# Patient Record
Sex: FEMALE | Race: WHITE | NOT HISPANIC OR LATINO | Employment: FULL TIME | URBAN - METROPOLITAN AREA
[De-identification: names, ages, dates, MRNs, and addresses within clinical notes are randomized per-mention and may not be internally consistent; named-entity substitution may affect disease eponyms.]

---

## 2019-06-13 ENCOUNTER — HOSPITAL ENCOUNTER (OUTPATIENT)
Dept: PREADMISSION TESTING | Facility: HOSPITAL | Age: 44
Discharge: HOME | End: 2019-06-13
Attending: ORTHOPAEDIC SURGERY | Admitting: ORTHOPAEDIC SURGERY

## 2019-06-13 LAB
ALBUMIN SERPL-MCNC: 4 G/DL (ref 3.5–4.8)
ALBUMIN/GLOB SERPL: 1.6 {RATIO} (ref 1–1.7)
ALP SERPL-CCNC: 27 IU/L (ref 32–91)
ALT SERPL-CCNC: 13 IU/L (ref 14–54)
ANION GAP SERPL CALC-SCNC: 10.8 MMOL/L (ref 10–20)
AST SERPL-CCNC: 18 IU/L (ref 15–41)
BASOPHILS # BLD AUTO: 0.1 10*3/UL (ref 0–0.2)
BASOPHILS NFR BLD AUTO: 1 % (ref 0–2)
BILIRUB SERPL-MCNC: 0.7 MG/DL (ref 0.3–1.2)
BUN SERPL-MCNC: 10 MG/DL (ref 8–20)
BUN/CREAT SERPL: 16.7 (ref 5.4–26.2)
CALCIUM SERPL-MCNC: 8.9 MG/DL (ref 8.9–10.3)
CHLORIDE SERPL-SCNC: 108 MMOL/L (ref 101–111)
CONV CO2: 24 MMOL/L (ref 22–32)
CONV TOTAL PROTEIN: 6.5 G/DL (ref 6.1–7.9)
CREAT UR-MCNC: 0.6 MG/DL (ref 0.4–1)
DIFFERENTIAL METHOD BLD: (no result)
EOSINOPHIL # BLD AUTO: 0.2 10*3/UL (ref 0–0.3)
EOSINOPHIL # BLD AUTO: 2 % (ref 0–3)
ERYTHROCYTE [DISTWIDTH] IN BLOOD BY AUTOMATED COUNT: 14.8 % (ref 11.5–14.5)
GLOBULIN UR ELPH-MCNC: 2.5 G/DL (ref 2.5–3.8)
GLUCOSE SERPL-MCNC: 82 MG/DL (ref 65–99)
HCT VFR BLD AUTO: 39.4 % (ref 35–49)
HGB BLD-MCNC: 12.8 G/DL (ref 12–15)
LYMPHOCYTES # BLD AUTO: 2.7 10*3/UL (ref 0.8–4.8)
LYMPHOCYTES NFR BLD AUTO: 38 % (ref 18–42)
MCH RBC QN AUTO: 28.5 PG (ref 26–32)
MCHC RBC AUTO-ENTMCNC: 32.5 G/DL (ref 32–36)
MCV RBC AUTO: 87.8 FL (ref 80–94)
MONOCYTES # BLD AUTO: 0.6 10*3/UL (ref 0.1–1.3)
MONOCYTES NFR BLD AUTO: 8 % (ref 2–11)
NEUTROPHILS # BLD AUTO: 3.6 10*3/UL (ref 2.3–8.6)
NEUTROPHILS NFR BLD AUTO: 51 % (ref 50–75)
NRBC BLD AUTO-RTO: 0 /100{WBCS}
NRBC/RBC NFR BLD MANUAL: 0 10*3/UL
PLATELET # BLD AUTO: 228 10*3/UL (ref 150–450)
PMV BLD AUTO: 10.9 FL (ref 7.4–10.4)
POTASSIUM SERPL-SCNC: 3.8 MMOL/L (ref 3.6–5.1)
RBC # BLD AUTO: 4.49 10*6/UL (ref 4–5.4)
SODIUM SERPL-SCNC: 139 MMOL/L (ref 136–144)
WBC # BLD AUTO: 7.1 10*3/UL (ref 4.5–11.5)

## 2019-07-15 ENCOUNTER — OFFICE VISIT (OUTPATIENT)
Dept: PHYSICAL THERAPY | Facility: CLINIC | Age: 44
End: 2019-07-15

## 2019-07-15 ENCOUNTER — TRANSCRIBE ORDERS (OUTPATIENT)
Dept: PHYSICAL THERAPY | Facility: CLINIC | Age: 44
End: 2019-07-15

## 2019-07-15 DIAGNOSIS — M25.562 LEFT KNEE PAIN, UNSPECIFIED CHRONICITY: Primary | ICD-10-CM

## 2019-07-15 DIAGNOSIS — M25.562 ACUTE PAIN OF LEFT KNEE: ICD-10-CM

## 2019-07-15 DIAGNOSIS — S83.242D TEAR OF MEDIAL MENISCUS OF LEFT KNEE, UNSPECIFIED TEAR TYPE, UNSPECIFIED WHETHER OLD OR CURRENT TEAR, SUBSEQUENT ENCOUNTER: ICD-10-CM

## 2019-07-15 DIAGNOSIS — Z98.890 S/P ARTHROSCOPIC PARTIAL MEDIAL MENISCECTOMY: Primary | ICD-10-CM

## 2019-07-15 PROCEDURE — 97161 PT EVAL LOW COMPLEX 20 MIN: CPT | Performed by: PHYSICAL THERAPIST

## 2019-07-15 PROCEDURE — 97014 ELECTRIC STIMULATION THERAPY: CPT | Performed by: PHYSICAL THERAPIST

## 2019-07-15 PROCEDURE — 97110 THERAPEUTIC EXERCISES: CPT | Performed by: PHYSICAL THERAPIST

## 2019-07-15 NOTE — PROGRESS NOTES
Physical Therapy Initial Evaluation and Plan of Care    Patient: Kellen Richard   : 1975  Diagnosis/ICD-10 Code:  S/P arthroscopic partial medial meniscectomy [Z98.890]  Referring practitioner: Rene Bridges MD  Date of Initial Visit: 7/15/2019  Today's Date: 7/15/2019  Patient seen for 1 sessions           Subjective Questionnaire: LEFS: 32/80, indicating 60% impairment      Subjective Evaluation    History of Present Illness  Date of surgery: 2019      Patient Occupation: full time as  at [employer]  Current pain ratin  At best pain ratin  At worst pain ratin  Location: L knee  Relieving factors: ice and medications  Aggravating factors: standing, stairs, ambulation, prolonged positioning, lifting and sleeping  Progression: improved    Patient Goals  Patient goals for therapy: decreased edema, decreased pain, improved balance, increased motion, return to work and increased strength      Pt is s/p partial L medial meniscectomy. Pt reports unsure of what caused injury, no accident or incident. Pt is returning to work tomorrow for a 5 hr shift. Pt reports difficulty with walking, going up/down stairs. Difficulty sleeping.      Objective       Active Range of Motion   Left Knee   Flexion: 100 degrees with pain  Extension: 0 degrees     Right Knee   Flexion: 135 degrees   Extension: 5 degrees     Passive Range of Motion   Left Knee   Flexion: 105 degrees with pain    Strength/Myotome Testing     Left Hip   Planes of Motion   Flexion: 3  Extension: 4-  Abduction: 4-    Right Hip   Planes of Motion   Flexion: 4+  Extension: 4+  Abduction: 4+    Left Knee   Flexion: 3+  Extension: 3    Right Knee   Flexion: 4+  Extension: 4+    Ambulation     Observational Gait   Gait: antalgic   Decreased walking speed, left stance time and left step length.   Left foot contact pattern: foot flat    Quality of Movement During Gait     Knee    Knee (Left): Positive stiff knee and valgus.   Knee  (Right): Positive valgus.     General Comments     Knee Comments  L quad and hip flexor tightness noted.  Mild L hamstring tightness.         Assessment & Plan     Assessment  Impairments: abnormal gait, abnormal muscle firing, abnormal or restricted ROM, activity intolerance, impaired physical strength, lacks appropriate home exercise program and pain with function  Prognosis: good  Functional Limitations: lifting, sleeping, walking, uncomfortable because of pain, sitting and standing  Goals  Plan Goals: STG:  - Pt to increase L knee flexion AROM to 120 deg in 2 weeks.  - Pt to report max pain at 6/10 while working in 2 weeks.    LTG:  - Improve LEFS to 20% or less impairment by discharge.  - Increase L knee flex and ext strength to 4/5 or greater by discharge.  - Pt to be independent with HEP by discharge.  - Ambulation and stair climbing ability to return to OF by discharge.    Plan  Therapy options: will be seen for skilled physical therapy services  Planned modality interventions: electrical stimulation/Russian stimulation, thermotherapy (hydrocollator packs) and cryotherapy  Planned therapy interventions: manual therapy, joint mobilization, home exercise program, gait training, strengthening, stretching, balance/weight-bearing training, neuromuscular re-education, transfer training and therapeutic activities  Frequency: 2x week  Duration in visits: 12  Treatment plan discussed with: patient  Plan details: Frequency: 2-3x/wk        Timed:         Manual Therapy:    5     mins  68555;     Therapeutic Exercise:    15    mins  04816;     Neuromuscular Evan:        mins  44986;    Therapeutic Activity:          mins  28257;     Gait Training:           mins  76442;     Ultrasound:          mins  79952;    Ionto                                   mins   27940  Can Repos          mins 23615    Un-Timed:  Electrical Stimulation:    15     mins  23317 ( );  Dry Needling          mins self-pay  Traction           mins 22747  Low Eval     20     Mins  35688  Mod Eval          Mins  58441  High Eval                            Mins  92600  Self - Care                          mins  63191        Timed Treatment:   20   mins   Total Treatment:     55   mins    PT SIGNATURE: Keisha [place], PT   DATE TREATMENT INITIATED: 7/15/2019    Initial Certification  Certification Period: 10/13/2019  I certify that the therapy services are furnished while this patient is under my care.  The services outlined above are required by this patient, and will be reviewed every 90 days.     PHYSICIAN:       Dr. Rene Bridges MD    _______________________________________________    DATE:     Please sign and return via fax to  .. Thank you, [facility].

## 2019-07-16 ENCOUNTER — OFFICE VISIT (OUTPATIENT)
Dept: PHYSICAL THERAPY | Facility: CLINIC | Age: 44
End: 2019-07-16

## 2019-07-16 DIAGNOSIS — M25.562 ACUTE PAIN OF LEFT KNEE: ICD-10-CM

## 2019-07-16 DIAGNOSIS — Z98.890 S/P ARTHROSCOPIC PARTIAL MEDIAL MENISCECTOMY: Primary | ICD-10-CM

## 2019-07-16 PROCEDURE — 97110 THERAPEUTIC EXERCISES: CPT | Performed by: PHYSICAL THERAPIST

## 2019-07-16 PROCEDURE — 97014 ELECTRIC STIMULATION THERAPY: CPT | Performed by: PHYSICAL THERAPIST

## 2019-07-16 NOTE — PROGRESS NOTES
Physical Therapy Daily Progress Note      Patient: Kellen Richard   : 1975  Diagnosis/ICD-10 Code:  S/P arthroscopic partial medial meniscectomy [Z98.890]  Referring practitioner: Rene Bridges MD  Date of Initial Visit: Type: THERAPY  Noted: 7/15/2019  Today's Date: 2019  Patient seen for 2 sessions        Subjective : Reports she is a little sore from PT visit yesterday, mostly muscle soreness. Will be working 5 hr today for first day back to work.    Objective : Tightness and tenderness noted medial distal hamstring. Improved quad contraction noted. Encouraged pt to use ice on knee after work. Pt verbalized understanding.  See Exercise, Manual, and Modality Logs for complete treatment.       Assessment/Plan : Mild soreness from initiation of therex at PT eval and visit yesterday. Good tolerance to therex with improved quad contraction noted during QS.     Progress per Plan of Care. Assess tolerance to return to work.           Timed:         Manual Therapy:    5     mins  19155;     Therapeutic Exercise:    25    mins  94794;     Neuromuscular Evan:        mins  29295;    Therapeutic Activity:          mins  84916;     Gait Training:           mins  80812;     Ultrasound:          mins  66066;    Ionto                                   mins   54772  Self Care                            mins   77645  Canalith Repos                   mins  4209    Un-Timed:  Electrical Stimulation:    15     mins  50894 ( );  Dry Needling          mins self-pay  Traction          mins 30379  Low Eval          Mins  93559  Mod Eval          Mins  51053  High Eval                            Mins  58773    Timed Treatment:   45   mins   Total Treatment:     45   mins    Keisha Granger, PT  Physical Therapist

## 2019-07-24 ENCOUNTER — OFFICE VISIT (OUTPATIENT)
Dept: PHYSICAL THERAPY | Facility: CLINIC | Age: 44
End: 2019-07-24

## 2019-07-24 DIAGNOSIS — Z98.890 S/P ARTHROSCOPIC PARTIAL MEDIAL MENISCECTOMY: Primary | ICD-10-CM

## 2019-07-24 DIAGNOSIS — M25.562 ACUTE PAIN OF LEFT KNEE: ICD-10-CM

## 2019-07-24 PROCEDURE — 97110 THERAPEUTIC EXERCISES: CPT | Performed by: PHYSICAL THERAPIST

## 2019-07-24 NOTE — PROGRESS NOTES
Physical Therapy Daily Progress Note      Patient: Kellen Richard   : 1975  Diagnosis/ICD-10 Code:  S/P arthroscopic partial medial meniscectomy [Z98.890]  Referring practitioner: Rene Bridges MD  Date of Initial Visit: Type: THERAPY  Noted: 7/15/2019  Today's Date: 2019  Patient seen for 3 sessions        Subjective : Pt reports that L knee was sore with returning to work last week but was able to complete her 6 hr shifts and sat down as needed and iced knee and iced when she got home also. States no pain today and was able to work 6 hr yesterday with no pain. Is scheduled to work 10 hr today.    Objective : L knee AROM: ext = 2 deg, flex =121. Pt declined modalities due to no pain.  See Exercise, Manual, and Modality Logs for complete treatment.       Assessment/Plan : No pain at start of session. Good tolerance to therex progression. Increased L knee AROM.    Progress per Plan of Care           Timed:         Manual Therapy:    5     mins  03998;     Therapeutic Exercise:    24     mins  52855;     Neuromuscular Evan:        mins  94535;    Therapeutic Activity:          mins  75554;     Gait Training:           mins  97967;     Ultrasound:          mins  29447;    Ionto                                   mins   39870  Self Care                            mins   00579  Canalith Repos                   mins  4209    Un-Timed:  Electrical Stimulation:         mins  44887 ( );  Dry Needling          mins self-pay  Traction          mins 45149  Low Eval          Mins  35116  Mod Eval          Mins  44744  High Eval                            Mins  13437    Timed Treatment:   29   mins   Total Treatment:     29   mins    Kiesha Granger, PT  Physical Therapist

## 2019-08-27 ENCOUNTER — DOCUMENTATION (OUTPATIENT)
Dept: PHYSICAL THERAPY | Facility: CLINIC | Age: 44
End: 2019-08-27

## 2019-08-27 DIAGNOSIS — M25.562 ACUTE PAIN OF LEFT KNEE: ICD-10-CM

## 2019-08-27 DIAGNOSIS — Z98.890 S/P ARTHROSCOPIC PARTIAL MEDIAL MENISCECTOMY: Primary | ICD-10-CM

## 2019-08-27 NOTE — PROGRESS NOTES
Discharge Summary  Discharge Summary from Physical Therapy Report      Dates  PT visit: 7/15/0019-7/24/2019  Number of Visits: 3     Discharge Status of Patient: See PT progress Note dated 7/24/2019    Goals: Partially Met    Discharge Plan: Continue with current home exercise program as instructed    Comments : Pt was seen for 3 PT visits and was making good initial gains with strength gains and decreased pain. Pt had returned to work with minimal difficulty. Pt did not come to last 2 scheduled appt and did not call to schedule additional visits. D/C to HEP.    Date of Discharge 8/27/2019        Keisha Granger, PT  Physical Therapist

## 2023-08-25 ENCOUNTER — APPOINTMENT (OUTPATIENT)
Dept: CT IMAGING | Facility: HOSPITAL | Age: 48
End: 2023-08-25
Payer: COMMERCIAL

## 2023-08-25 ENCOUNTER — HOSPITAL ENCOUNTER (EMERGENCY)
Facility: HOSPITAL | Age: 48
Discharge: HOME | End: 2023-08-25
Attending: EMERGENCY MEDICINE
Payer: COMMERCIAL

## 2023-08-25 VITALS
TEMPERATURE: 98 F | RESPIRATION RATE: 17 BRPM | HEART RATE: 72 BPM | OXYGEN SATURATION: 100 % | WEIGHT: 173.5 LBS | HEIGHT: 60 IN | BODY MASS INDEX: 34.06 KG/M2 | SYSTOLIC BLOOD PRESSURE: 96 MMHG | DIASTOLIC BLOOD PRESSURE: 47 MMHG

## 2023-08-25 DIAGNOSIS — R51.9 NONINTRACTABLE HEADACHE, UNSPECIFIED CHRONICITY PATTERN, UNSPECIFIED HEADACHE TYPE: Primary | ICD-10-CM

## 2023-08-25 PROCEDURE — 96374 THER/PROPH/DIAG INJ IV PUSH: CPT

## 2023-08-25 PROCEDURE — 25010000002 KETOROLAC TROMETHAMINE PER 15 MG: Performed by: PHYSICIAN ASSISTANT

## 2023-08-25 PROCEDURE — 70450 CT HEAD/BRAIN W/O DYE: CPT

## 2023-08-25 PROCEDURE — 96375 TX/PRO/DX INJ NEW DRUG ADDON: CPT

## 2023-08-25 PROCEDURE — 25010000002 DIPHENHYDRAMINE PER 50 MG: Performed by: PHYSICIAN ASSISTANT

## 2023-08-25 PROCEDURE — 99284 EMERGENCY DEPT VISIT MOD MDM: CPT

## 2023-08-25 PROCEDURE — 25010000002 PROCHLORPERAZINE 10 MG/2ML SOLUTION: Performed by: PHYSICIAN ASSISTANT

## 2023-08-25 RX ORDER — DIPHENHYDRAMINE HYDROCHLORIDE 50 MG/ML
25 INJECTION INTRAMUSCULAR; INTRAVENOUS ONCE
Status: COMPLETED | OUTPATIENT
Start: 2023-08-25 | End: 2023-08-25

## 2023-08-25 RX ORDER — SODIUM CHLORIDE 9 MG/ML
125 INJECTION, SOLUTION INTRAVENOUS CONTINUOUS
Status: DISCONTINUED | OUTPATIENT
Start: 2023-08-25 | End: 2023-08-25 | Stop reason: HOSPADM

## 2023-08-25 RX ORDER — KETOROLAC TROMETHAMINE 30 MG/ML
30 INJECTION, SOLUTION INTRAMUSCULAR; INTRAVENOUS ONCE
Status: COMPLETED | OUTPATIENT
Start: 2023-08-25 | End: 2023-08-25

## 2023-08-25 RX ORDER — PROCHLORPERAZINE EDISYLATE 5 MG/ML
10 INJECTION INTRAMUSCULAR; INTRAVENOUS ONCE
Status: COMPLETED | OUTPATIENT
Start: 2023-08-25 | End: 2023-08-25

## 2023-08-25 RX ORDER — SODIUM CHLORIDE 0.9 % (FLUSH) 0.9 %
10 SYRINGE (ML) INJECTION AS NEEDED
Status: DISCONTINUED | OUTPATIENT
Start: 2023-08-25 | End: 2023-08-25 | Stop reason: HOSPADM

## 2023-08-25 RX ADMIN — PROCHLORPERAZINE EDISYLATE 10 MG: 5 INJECTION INTRAMUSCULAR; INTRAVENOUS at 16:28

## 2023-08-25 RX ADMIN — SODIUM CHLORIDE 1000 ML: 9 INJECTION, SOLUTION INTRAVENOUS at 16:28

## 2023-08-25 RX ADMIN — DIPHENHYDRAMINE HYDROCHLORIDE 25 MG: 50 INJECTION, SOLUTION INTRAMUSCULAR; INTRAVENOUS at 16:27

## 2023-08-25 RX ADMIN — KETOROLAC TROMETHAMINE 30 MG: 30 INJECTION, SOLUTION INTRAMUSCULAR; INTRAVENOUS at 16:28

## 2023-08-25 NOTE — DISCHARGE INSTRUCTIONS
Follow-up with primary care    Follow-up with neurology as needed    Return to the ED for new or worsening symptoms

## 2023-08-25 NOTE — ED PROVIDER NOTES
Subjective   History of Present Illness  Chief Complaint: Headache      HPI: Patient is a 47-year-old female who presents to the emergency room by private vehicle, known history of migraines typically takes Imitrex and Topamax stated that she is taking 200 mg of Imitrex and 100 mg of Topamax prior to arrival.  Awoke this morning with a headache at approximately 5 AM she has had some nausea without vomiting photophobia and phonophobia.  States it is worse than her normal migraines and has had no relief with her outpatient treatment.    PCP: [facility]    History provided by:  Patient    Review of Systems   Gastrointestinal:  Positive for nausea.   Neurological:  Positive for headaches.     Past Medical History:   Diagnosis Date    Arthritis     Headache        No Known Allergies    History reviewed. No pertinent surgical history.    History reviewed. No pertinent family history.    Social History     Socioeconomic History    Marital status:    Tobacco Use    Smoking status: Never    Smokeless tobacco: Never   Substance and Sexual Activity    Alcohol use: Yes     Alcohol/week: 2.0 standard drinks     Types: 2 Glasses of wine per week    Drug use: No           Objective   Physical Exam  Vitals reviewed.   HENT:      Head: Normocephalic.      Mouth/Throat:      Mouth: Mucous membranes are moist.      Pharynx: Oropharynx is clear.   Eyes:      Extraocular Movements: Extraocular movements intact.      Pupils: Pupils are equal, round, and reactive to light.   Cardiovascular:      Rate and Rhythm: Normal rate.      Pulses: Normal pulses.   Pulmonary:      Effort: Pulmonary effort is normal.   Musculoskeletal:         General: Normal range of motion.      Cervical back: Normal range of motion and neck supple. No rigidity.   Skin:     General: Skin is warm and dry.      Capillary Refill: Capillary refill takes less than 2 seconds.   Neurological:      General: No focal deficit present.      Mental Status: She is alert and  "oriented to person, place, and time. Mental status is at baseline.       Procedures           ED Course  ED Course as of 08/25/23 1924   Fri Aug 25, 2023   1621 Unfortunately nursing staff unsuccessful placement of intravenous access, delay in medication.  []   1738 Continue to await transport to CT   []      ED Course User Index  [] Gabby Atkinson APRN      BP 96/47 (BP Location: Right arm, Patient Position: Lying)   Pulse 72   Temp 98 øF (36.7 øC) (Oral)   Resp 17   Ht 152.4 cm (60\")   Wt 78.7 kg (173 lb 8 oz)   SpO2 100%   BMI 33.88 kg/mý   Labs Reviewed - No data to display  Medications   sodium chloride 0.9 % flush 10 mL (has no administration in time range)   sodium chloride 0.9 % infusion (has no administration in time range)   sodium chloride 0.9 % bolus 1,000 mL (0 mL Intravenous Stopped 8/25/23 1902)   ketorolac (TORADOL) injection 30 mg (30 mg Intravenous Given 8/25/23 1628)   prochlorperazine (COMPAZINE) injection 10 mg (10 mg Intravenous Given 8/25/23 1628)   diphenhydrAMINE (BENADRYL) injection 25 mg (25 mg Intravenous Given 8/25/23 1627)     CT Head Without Contrast    Result Date: 8/25/2023  Impression: Unremarkable head CT. Electronically Signed: Washington Schaffer MD  8/25/2023 6:13 PM EDT  Workstation ID: [id number]                                        Medical Decision Making  Patient presented to the emergency room complaining of headache, worse than her usual migraines had attempted treatment with her prescribed medications with minimal improvement.  An IV was established she was given a fluid bolus as well as Toradol Benadryl and Compazine.  CT of the head was obtained was negative for intracranial hemorrhage or mass effect per my interpretation see ED course for official read.  At reevaluation patient was resting reports significant improvement of her pain.  We discussed following up with PCP and continue her medications as prescribed, she was given the phone number for " neurology to follow-up with as needed if her migraines become worse or persist.  Patient gave verbal understanding of discharge instruction, she was alert oriented nontoxic in appearance time discharge with stable vital signs, denied further questions or complaints.    Chart review: 8/22/2023 outpatient visit with PCP related to chronic medical problems      Note Disclaimer: At [facility], we believe that sharing information builds trust and better  relationships. You are receiving this note because you recently visited [facility]. It is possible you will see health information before a provider has talked with you about it. This kind of information can be easy to misunderstand. To help you fully understand what it means for your health, we urge you to discuss this note with your provider.       Part of this note may be an electronic transcription/translation of spoken language to printed text using the Dragon Dictation System.    Appropriate PPE worn during exam.    Problems Addressed:  Nonintractable headache, unspecified chronicity pattern, unspecified headache type: complicated acute illness or injury    Amount and/or Complexity of Data Reviewed  External Data Reviewed: labs.  Radiology: ordered and independent interpretation performed. Decision-making details documented in ED Course.    Risk  Prescription drug management.        Final diagnoses:   Nonintractable headache, unspecified chronicity pattern, unspecified headache type       ED Disposition  ED Disposition       ED Disposition   Discharge    Condition   Stable    Comment   --               Rubi Urban, YOVANI  [address] [facility] [address]  [facility] [address]  [phone]          [address]  [facility] [address]  [phone]        Justin Schneider, DO  [address] [facility] [address]  [place] [id number] [phone]               Medication List      No changes were made to your prescriptions  during this visit.            Gabby Atkinson, APRN  08/25/23 1924